# Patient Record
Sex: MALE | Race: OTHER | Employment: FULL TIME | URBAN - METROPOLITAN AREA
[De-identification: names, ages, dates, MRNs, and addresses within clinical notes are randomized per-mention and may not be internally consistent; named-entity substitution may affect disease eponyms.]

---

## 2017-08-15 ENCOUNTER — TELEPHONE (OUTPATIENT)
Dept: INTERNAL MEDICINE CLINIC | Facility: CLINIC | Age: 37
End: 2017-08-15

## 2017-08-15 ENCOUNTER — APPOINTMENT (OUTPATIENT)
Dept: GENERAL RADIOLOGY | Age: 37
End: 2017-08-15
Attending: PHYSICIAN ASSISTANT
Payer: COMMERCIAL

## 2017-08-15 ENCOUNTER — APPOINTMENT (OUTPATIENT)
Dept: CT IMAGING | Age: 37
End: 2017-08-15
Attending: PHYSICIAN ASSISTANT
Payer: COMMERCIAL

## 2017-08-15 ENCOUNTER — HOSPITAL ENCOUNTER (OUTPATIENT)
Age: 37
Discharge: HOME | End: 2017-08-15
Payer: COMMERCIAL

## 2017-08-15 VITALS
HEART RATE: 81 BPM | SYSTOLIC BLOOD PRESSURE: 143 MMHG | OXYGEN SATURATION: 99 % | TEMPERATURE: 99 F | DIASTOLIC BLOOD PRESSURE: 97 MMHG | RESPIRATION RATE: 18 BRPM

## 2017-08-15 DIAGNOSIS — S00.03XA HEMATOMA OF FRONTAL SCALP, INITIAL ENCOUNTER: ICD-10-CM

## 2017-08-15 DIAGNOSIS — M79.622 LEFT UPPER ARM PAIN: ICD-10-CM

## 2017-08-15 DIAGNOSIS — H61.032 CHONDRITIS OF LEFT EXTERNAL EAR: ICD-10-CM

## 2017-08-15 DIAGNOSIS — S40.022A TRAUMATIC HEMATOMA OF LEFT UPPER ARM, INITIAL ENCOUNTER: ICD-10-CM

## 2017-08-15 DIAGNOSIS — S00.412A ABRASION OF LEFT EAR, INITIAL ENCOUNTER: ICD-10-CM

## 2017-08-15 DIAGNOSIS — T14.90XA BLUNT TRAUMA: Primary | ICD-10-CM

## 2017-08-15 PROCEDURE — 99204 OFFICE O/P NEW MOD 45 MIN: CPT

## 2017-08-15 PROCEDURE — 73060 X-RAY EXAM OF HUMERUS: CPT | Performed by: PHYSICIAN ASSISTANT

## 2017-08-15 PROCEDURE — 90471 IMMUNIZATION ADMIN: CPT

## 2017-08-15 PROCEDURE — 70486 CT MAXILLOFACIAL W/O DYE: CPT | Performed by: PHYSICIAN ASSISTANT

## 2017-08-15 PROCEDURE — 76377 3D RENDER W/INTRP POSTPROCES: CPT

## 2017-08-15 RX ORDER — LEVOFLOXACIN 750 MG/1
750 TABLET ORAL DAILY
Qty: 10 TABLET | Refills: 0 | Status: SHIPPED | OUTPATIENT
Start: 2017-08-15 | End: 2017-08-25

## 2017-08-15 NOTE — ED PROVIDER NOTES
Patient Seen in: [facility]    History   Patient presents with:  Head Injury  Swelling  Laceration Abrasion (integumentary)    Stated Complaint: left side of head & arm pain,injury    HPI    CHIEF COMPLAINT: Multiple complaints (750 mg total) by mouth daily.        Family History   Problem Relation Age of Onset   • Family history unknown: Yes       Smoking status: Never Smoker                                                              Smokeless tobacco: Never Used bleeding; dried blood bilateral nares. No septal hematoma.   Respiratory: there are no retractions, lungs are clear to auscultation  Cardiovascular: regular rate and rhythm  Gastrointestinal:  abdomen is soft and non tender, no masses, bowel sounds normal. OF THE FACIAL BONES WITHOUT CONTRAST  COMPARISON:  None. INDICATIONS:  left side of head & arm pain,injury  TECHNIQUE:  Noncontrast CT scanning is performed through the facial bones.  3D shaded surface renderings are created on an independent CT scanner wo had a CT of the facial bones which was negative for fracture and an x-ray of his left humerus which was also negative for fracture. He can take Tylenol or ibuprofen as needed for pain, and ice the affected areas.   His exam reveals a mild chondritis of the
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
## 2017-08-22 NOTE — TELEPHONE ENCOUNTER
This patient needs to be seen. Schedule w/ Hector Disla. Abhay Palmer. Cass Lundberg MD  Diplomate, American Board of Internal Medicine  [facility]  [address] [facility] [address],[address], [address], [facility], [address]  T: [id number]; F: [address]

## 2017-10-09 ENCOUNTER — OFFICE VISIT (OUTPATIENT)
Dept: INTERNAL MEDICINE CLINIC | Facility: CLINIC | Age: 37
End: 2017-10-09

## 2017-10-09 ENCOUNTER — LAB ENCOUNTER (OUTPATIENT)
Dept: LAB | Age: 37
End: 2017-10-09
Attending: INTERNAL MEDICINE
Payer: COMMERCIAL

## 2017-10-09 VITALS
WEIGHT: 168.25 LBS | HEART RATE: 72 BPM | TEMPERATURE: 98 F | BODY MASS INDEX: 28.03 KG/M2 | SYSTOLIC BLOOD PRESSURE: 120 MMHG | DIASTOLIC BLOOD PRESSURE: 82 MMHG | HEIGHT: 65 IN | RESPIRATION RATE: 17 BRPM

## 2017-10-09 DIAGNOSIS — Z00.00 ROUTINE GENERAL MEDICAL EXAMINATION AT A HEALTH CARE FACILITY: ICD-10-CM

## 2017-10-09 DIAGNOSIS — Z00.00 ROUTINE GENERAL MEDICAL EXAMINATION AT A HEALTH CARE FACILITY: Primary | ICD-10-CM

## 2017-10-09 DIAGNOSIS — Z23 FLU VACCINE NEED: ICD-10-CM

## 2017-10-09 DIAGNOSIS — F40.10 SOCIAL PHOBIA: ICD-10-CM

## 2017-10-09 DIAGNOSIS — J30.89 CHRONIC NON-SEASONAL ALLERGIC RHINITIS, UNSPECIFIED TRIGGER: ICD-10-CM

## 2017-10-09 PROCEDURE — 90686 IIV4 VACC NO PRSV 0.5 ML IM: CPT | Performed by: INTERNAL MEDICINE

## 2017-10-09 PROCEDURE — 99385 PREV VISIT NEW AGE 18-39: CPT | Performed by: INTERNAL MEDICINE

## 2017-10-09 PROCEDURE — 90471 IMMUNIZATION ADMIN: CPT | Performed by: INTERNAL MEDICINE

## 2017-10-09 PROCEDURE — 85025 COMPLETE CBC W/AUTO DIFF WBC: CPT

## 2017-10-09 PROCEDURE — 81003 URINALYSIS AUTO W/O SCOPE: CPT

## 2017-10-09 PROCEDURE — 80053 COMPREHEN METABOLIC PANEL: CPT

## 2017-10-09 PROCEDURE — 82306 VITAMIN D 25 HYDROXY: CPT

## 2017-10-09 PROCEDURE — 80061 LIPID PANEL: CPT

## 2017-10-09 PROCEDURE — 84443 ASSAY THYROID STIM HORMONE: CPT

## 2017-10-09 PROCEDURE — 36415 COLL VENOUS BLD VENIPUNCTURE: CPT

## 2017-10-09 PROCEDURE — 83036 HEMOGLOBIN GLYCOSYLATED A1C: CPT

## 2017-10-09 NOTE — PROGRESS NOTES
Patient presents with:  CPX: fasting      HPI: The pt is a 29 y/o HM, English-speaking, last OV in 2013, here to re-establish PCP and to undergo the male CPX. Health goals center around longevity, vitality, and QOL. He's due for wellness labs.     ROS: No issues. 4. Social phobia - Handout given. Send to [facility]. Resource given. 5. RTC 1 year or PRN. Patient verbally agrees to and understands the plan as outlined above.   Patient was also afforded the time and opportunity to ask questions, which were then a

## 2017-10-09 NOTE — PATIENT INSTRUCTIONS
Understanding Social Phobia (Social Anxiety Disorder)     Talk to your healthcare provider about treatment for social phobia. 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 Just thinking about it makes your heart race.  Your throat gets tight, and you can Asking for help may be very hard for you, but the effort will be worth it. Certain medications may lessen your symptoms. And behavioral therapy may help you conquer your fears.  This involves working with your therapist to learn how to relax when you feel a

## 2020-03-17 ENCOUNTER — TELEPHONE (OUTPATIENT)
Dept: INTERNAL MEDICINE CLINIC | Facility: CLINIC | Age: 40
End: 2020-03-17

## 2020-03-17 RX ORDER — AZITHROMYCIN 250 MG/1
TABLET, FILM COATED ORAL
Qty: 6 TABLET | Refills: 0 | Status: SHIPPED | OUTPATIENT
Start: 2020-03-17

## 2020-03-17 RX ORDER — DEXTROMETHORPHAN HYDROBROMIDE AND PROMETHAZINE HYDROCHLORIDE 15; 6.25 MG/5ML; MG/5ML
5 SYRUP ORAL 4 TIMES DAILY PRN
Qty: 180 ML | Refills: 0 | Status: SHIPPED | OUTPATIENT
Start: 2020-03-17

## 2020-03-17 NOTE — TELEPHONE ENCOUNTER
Patient calling he is experiencing shortness of breath, and feels \"hot this morning. 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   He states in Feb 2020 he did come down with symptoms of sore throat, felt [facility]

## 2020-03-17 NOTE — TELEPHONE ENCOUNTER
I spoke w/ patient. He may have just routine URI symptoms. He's been practicing common sense social distancing and other mitigation strategies. He does have concern about exposure to the novel coronavirus. Scripts sent to pharmacy. Gladis Bowman.  Edenilson Diehl MD

## 2020-03-17 NOTE — TELEPHONE ENCOUNTER
Pt travels for work (see message below). Today when he woke up felt as though he had a fever along with some sob but has not taken temp. Feels ok now but on and off he feels feverish. No c/o chills, cough or sore throat.   Concerned he is a carrier of Co

## (undated) NOTE — LETTER
[facility] [place]  [address] [facility] [address]  Dept: [phone]  Dept Fax: [phone]      August 15, 2017    Patient: Erich Bonilla   Date of Visit: 8/15/2017       To Whom It May Concern:    Erich Bonilla was seen and treated i